# Patient Record
Sex: MALE | Race: WHITE | NOT HISPANIC OR LATINO
[De-identification: names, ages, dates, MRNs, and addresses within clinical notes are randomized per-mention and may not be internally consistent; named-entity substitution may affect disease eponyms.]

---

## 2017-05-19 PROBLEM — Z00.00 ENCOUNTER FOR PREVENTIVE HEALTH EXAMINATION: Status: ACTIVE | Noted: 2017-05-19

## 2017-05-23 ENCOUNTER — APPOINTMENT (OUTPATIENT)
Dept: NEPHROLOGY | Facility: CLINIC | Age: 67
End: 2017-05-23

## 2017-05-23 ENCOUNTER — LABORATORY RESULT (OUTPATIENT)
Age: 67
End: 2017-05-23

## 2017-05-23 VITALS — WEIGHT: 231.48 LBS | HEIGHT: 71 IN | BODY MASS INDEX: 32.41 KG/M2

## 2017-05-23 VITALS — HEART RATE: 88 BPM | OXYGEN SATURATION: 97 %

## 2017-05-23 VITALS — SYSTOLIC BLOOD PRESSURE: 190 MMHG | DIASTOLIC BLOOD PRESSURE: 90 MMHG

## 2017-05-23 DIAGNOSIS — Z87.19 PERSONAL HISTORY OF OTHER DISEASES OF THE DIGESTIVE SYSTEM: ICD-10-CM

## 2017-05-23 DIAGNOSIS — M54.30 SCIATICA, UNSPECIFIED SIDE: ICD-10-CM

## 2017-05-23 DIAGNOSIS — Z87.2 PERSONAL HISTORY OF DISEASES OF THE SKIN AND SUBCUTANEOUS TISSUE: ICD-10-CM

## 2017-05-23 DIAGNOSIS — F41.0 PANIC DISORDER [EPISODIC PAROXYSMAL ANXIETY]: ICD-10-CM

## 2017-05-23 DIAGNOSIS — Z87.11 PERSONAL HISTORY OF PEPTIC ULCER DISEASE: ICD-10-CM

## 2017-05-24 ENCOUNTER — TRANSCRIPTION ENCOUNTER (OUTPATIENT)
Age: 67
End: 2017-05-24

## 2017-05-24 LAB
ALBUMIN SERPL ELPH-MCNC: 4.3 G/DL
ANION GAP SERPL CALC-SCNC: 19 MMOL/L
APPEARANCE: CLEAR
BACTERIA: NEGATIVE
BILIRUBIN URINE: NEGATIVE
BLOOD URINE: ABNORMAL
BUN SERPL-MCNC: 20 MG/DL
C3 SERPL-MCNC: 130 MG/DL
C4 SERPL-MCNC: 29 MG/DL
CALCIUM SERPL-MCNC: 10 MG/DL
CHLORIDE SERPL-SCNC: 100 MMOL/L
CK SERPL-CCNC: 63 U/L
CO2 SERPL-SCNC: 20 MMOL/L
COLOR: YELLOW
CREAT SERPL-MCNC: 1.24 MG/DL
CREAT SPEC-SCNC: 53 MG/DL
CREAT/PROT UR: 1.1 RATIO
DEPRECATED KAPPA LC FREE/LAMBDA SER: 1.11 RATIO
DSDNA AB SER-ACNC: <12 IU/ML
ENA RNP AB SER IA-ACNC: 0.9 AL
ENA SM AB SER IA-ACNC: <0.2 AL
GLUCOSE QUALITATIVE U: NORMAL MG/DL
GLUCOSE SERPL-MCNC: 101 MG/DL
HAV IGM SER QL: NONREACTIVE
HBA1C MFR BLD HPLC: 5.9 %
HBV CORE IGM SER QL: NONREACTIVE
HBV SURFACE AG SER QL: NONREACTIVE
HCV AB SER QL: NONREACTIVE
HCV S/CO RATIO: 0.13 S/CO
HYALINE CASTS: 0 /LPF
KAPPA LC CSF-MCNC: 2.3 MG/DL
KAPPA LC SERPL-MCNC: 2.55 MG/DL
KETONES URINE: NEGATIVE
LEUKOCYTE ESTERASE URINE: NEGATIVE
MICROSCOPIC-UA: NORMAL
NITRITE URINE: NEGATIVE
PH URINE: 6
PHOSPHATE SERPL-MCNC: 4.2 MG/DL
POTASSIUM SERPL-SCNC: 4.9 MMOL/L
PROT UR-MCNC: 58 MG/DL
PROTEIN URINE: 100 MG/DL
RED BLOOD CELLS URINE: 1 /HPF
SODIUM SERPL-SCNC: 139 MMOL/L
SPECIFIC GRAVITY URINE: 1.01
SQUAMOUS EPITHELIAL CELLS: 0 /HPF
UROBILINOGEN URINE: NORMAL MG/DL
WHITE BLOOD CELLS URINE: 0 /HPF

## 2017-05-25 LAB
ALBUMIN MFR SERPL ELPH: 59.7 %
ALBUMIN SERPL-MCNC: 4.5 G/DL
ALBUMIN/GLOB SERPL: 1.5 RATIO
ALPHA1 GLOB MFR SERPL ELPH: 3.8 %
ALPHA1 GLOB SERPL ELPH-MCNC: 0.3 G/DL
ALPHA2 GLOB MFR SERPL ELPH: 9.4 %
ALPHA2 GLOB SERPL ELPH-MCNC: 0.7 G/DL
B-GLOBULIN MFR SERPL ELPH: 14 %
B-GLOBULIN SERPL ELPH-MCNC: 1.1 G/DL
GAMMA GLOB FLD ELPH-MCNC: 1 G/DL
GAMMA GLOB MFR SERPL ELPH: 13.1 %
HISTONE AB SER QL: 0.5 UNITS
INTERPRETATION SERPL IEP-IMP: NORMAL
M PROTEIN SPEC IFE-MCNC: NORMAL
PROT SERPL-MCNC: 7.6 G/DL
PROT SERPL-MCNC: 7.6 G/DL

## 2017-05-25 RX ORDER — CARVEDILOL 12.5 MG/1
12.5 TABLET, FILM COATED ORAL
Refills: 0 | Status: DISCONTINUED | COMMUNITY
Start: 2017-05-23 | End: 2017-05-25

## 2017-06-09 ENCOUNTER — APPOINTMENT (OUTPATIENT)
Dept: ORTHOPEDIC SURGERY | Facility: CLINIC | Age: 67
End: 2017-06-09

## 2017-06-09 VITALS — HEART RATE: 116 BPM | DIASTOLIC BLOOD PRESSURE: 87 MMHG | SYSTOLIC BLOOD PRESSURE: 153 MMHG

## 2017-06-09 VITALS — BODY MASS INDEX: 32.34 KG/M2 | WEIGHT: 231 LBS | HEIGHT: 71 IN

## 2017-06-23 ENCOUNTER — APPOINTMENT (OUTPATIENT)
Dept: ORTHOPEDIC SURGERY | Facility: CLINIC | Age: 67
End: 2017-06-23

## 2017-06-23 VITALS
DIASTOLIC BLOOD PRESSURE: 74 MMHG | BODY MASS INDEX: 32.34 KG/M2 | SYSTOLIC BLOOD PRESSURE: 127 MMHG | HEART RATE: 123 BPM | WEIGHT: 231 LBS | HEIGHT: 71 IN

## 2017-06-23 DIAGNOSIS — M48.07 SPINAL STENOSIS, LUMBOSACRAL REGION: ICD-10-CM

## 2017-07-11 ENCOUNTER — FORM ENCOUNTER (OUTPATIENT)
Age: 67
End: 2017-07-11

## 2017-07-12 ENCOUNTER — OUTPATIENT (OUTPATIENT)
Dept: OUTPATIENT SERVICES | Facility: HOSPITAL | Age: 67
LOS: 1 days | End: 2017-07-12
Payer: MEDICARE

## 2017-07-12 ENCOUNTER — APPOINTMENT (OUTPATIENT)
Dept: MRI IMAGING | Facility: CLINIC | Age: 67
End: 2017-07-12

## 2017-07-12 DIAGNOSIS — M48.07 SPINAL STENOSIS, LUMBOSACRAL REGION: ICD-10-CM

## 2017-07-12 PROCEDURE — 72148 MRI LUMBAR SPINE W/O DYE: CPT

## 2017-07-17 ENCOUNTER — APPOINTMENT (OUTPATIENT)
Dept: ORTHOPEDIC SURGERY | Facility: CLINIC | Age: 67
End: 2017-07-17

## 2017-07-17 VITALS
BODY MASS INDEX: 32.34 KG/M2 | HEIGHT: 71 IN | DIASTOLIC BLOOD PRESSURE: 80 MMHG | WEIGHT: 231 LBS | SYSTOLIC BLOOD PRESSURE: 143 MMHG

## 2017-07-17 DIAGNOSIS — M54.16 RADICULOPATHY, LUMBAR REGION: ICD-10-CM

## 2017-08-30 ENCOUNTER — APPOINTMENT (OUTPATIENT)
Dept: NEPHROLOGY | Facility: CLINIC | Age: 67
End: 2017-08-30
Payer: MEDICARE

## 2017-08-30 VITALS
DIASTOLIC BLOOD PRESSURE: 103 MMHG | OXYGEN SATURATION: 98 % | SYSTOLIC BLOOD PRESSURE: 173 MMHG | BODY MASS INDEX: 32.62 KG/M2 | HEART RATE: 104 BPM | HEIGHT: 71 IN | WEIGHT: 233 LBS

## 2017-08-30 VITALS — SYSTOLIC BLOOD PRESSURE: 140 MMHG | DIASTOLIC BLOOD PRESSURE: 100 MMHG

## 2017-08-30 PROCEDURE — 99215 OFFICE O/P EST HI 40 MIN: CPT

## 2017-08-30 RX ORDER — METHYLPREDNISOLONE 4 MG/1
4 TABLET ORAL
Qty: 1 | Refills: 1 | Status: DISCONTINUED | COMMUNITY
Start: 2017-06-23 | End: 2017-08-30

## 2017-08-30 RX ORDER — METHYLPREDNISOLONE 4 MG/1
4 TABLET ORAL
Qty: 1 | Refills: 1 | Status: DISCONTINUED | COMMUNITY
Start: 2017-06-09 | End: 2017-08-30

## 2017-09-01 LAB
APPEARANCE: CLEAR
BACTERIA: ABNORMAL
BILIRUBIN URINE: NEGATIVE
BLOOD URINE: NEGATIVE
COLOR: YELLOW
CREAT SPEC-SCNC: 60 MG/DL
CREAT/PROT UR: 0.7 RATIO
GLUCOSE QUALITATIVE U: NORMAL MG/DL
HYALINE CASTS: 0 /LPF
KETONES URINE: NEGATIVE
LEUKOCYTE ESTERASE URINE: NEGATIVE
MICROSCOPIC-UA: NORMAL
NITRITE URINE: NEGATIVE
PH URINE: 6
PROT UR-MCNC: 40 MG/DL
PROTEIN URINE: 30 MG/DL
RED BLOOD CELLS URINE: 1 /HPF
SPECIFIC GRAVITY URINE: 1.01
SQUAMOUS EPITHELIAL CELLS: 1 /HPF
UROBILINOGEN URINE: NORMAL MG/DL
WHITE BLOOD CELLS URINE: 0 /HPF

## 2017-09-22 ENCOUNTER — MEDICATION RENEWAL (OUTPATIENT)
Age: 67
End: 2017-09-22

## 2017-12-20 ENCOUNTER — APPOINTMENT (OUTPATIENT)
Dept: NEPHROLOGY | Facility: CLINIC | Age: 67
End: 2017-12-20

## 2018-01-17 ENCOUNTER — APPOINTMENT (OUTPATIENT)
Dept: NEPHROLOGY | Facility: CLINIC | Age: 68
End: 2018-01-17
Payer: MEDICARE

## 2018-01-17 VITALS
WEIGHT: 236 LBS | SYSTOLIC BLOOD PRESSURE: 181 MMHG | BODY MASS INDEX: 33.04 KG/M2 | HEART RATE: 122 BPM | HEIGHT: 71 IN | OXYGEN SATURATION: 96 % | DIASTOLIC BLOOD PRESSURE: 105 MMHG

## 2018-01-17 PROCEDURE — 99215 OFFICE O/P EST HI 40 MIN: CPT

## 2018-01-18 LAB
25(OH)D3 SERPL-MCNC: 43.9 NG/ML
ALBUMIN SERPL ELPH-MCNC: 4.6 G/DL
ANION GAP SERPL CALC-SCNC: 13 MMOL/L
APPEARANCE: CLEAR
BACTERIA: NEGATIVE
BASOPHILS # BLD AUTO: 0.05 K/UL
BASOPHILS NFR BLD AUTO: 0.8 %
BILIRUBIN URINE: NEGATIVE
BLOOD URINE: NEGATIVE
BUN SERPL-MCNC: 22 MG/DL
CALCIUM SERPL-MCNC: 9.4 MG/DL
CHLORIDE SERPL-SCNC: 103 MMOL/L
CO2 SERPL-SCNC: 26 MMOL/L
COLOR: YELLOW
CREAT SERPL-MCNC: 1.19 MG/DL
CREAT SPEC-SCNC: 39 MG/DL
CREAT/PROT UR: 1.5 RATIO
EOSINOPHIL # BLD AUTO: 0.1 K/UL
EOSINOPHIL NFR BLD AUTO: 1.5 %
GLUCOSE QUALITATIVE U: NEGATIVE MG/DL
GLUCOSE SERPL-MCNC: 114 MG/DL
HCT VFR BLD CALC: 45.8 %
HGB BLD-MCNC: 16 G/DL
IMM GRANULOCYTES NFR BLD AUTO: 0.2 %
KETONES URINE: NEGATIVE
LEUKOCYTE ESTERASE URINE: NEGATIVE
LYMPHOCYTES # BLD AUTO: 1.11 K/UL
LYMPHOCYTES NFR BLD AUTO: 17 %
MAN DIFF?: NORMAL
MCHC RBC-ENTMCNC: 32.5 PG
MCHC RBC-ENTMCNC: 34.9 GM/DL
MCV RBC AUTO: 93.1 FL
MICROSCOPIC-UA: NORMAL
MONOCYTES # BLD AUTO: 0.79 K/UL
MONOCYTES NFR BLD AUTO: 12.1 %
NEUTROPHILS # BLD AUTO: 4.46 K/UL
NEUTROPHILS NFR BLD AUTO: 68.4 %
NITRITE URINE: NEGATIVE
PH URINE: 6
PHOSPHATE SERPL-MCNC: 2.9 MG/DL
PLATELET # BLD AUTO: 251 K/UL
POTASSIUM SERPL-SCNC: 4.6 MMOL/L
PROT UR-MCNC: 59 MG/DL
PROTEIN URINE: 100 MG/DL
RBC # BLD: 4.92 M/UL
RBC # FLD: 12.4 %
RED BLOOD CELLS URINE: 0 /HPF
SODIUM SERPL-SCNC: 142 MMOL/L
SPECIFIC GRAVITY URINE: 1.01
SQUAMOUS EPITHELIAL CELLS: 0 /HPF
UROBILINOGEN URINE: NEGATIVE MG/DL
WBC # FLD AUTO: 6.52 K/UL
WHITE BLOOD CELLS URINE: 1 /HPF

## 2018-02-21 LAB
ALBUMIN SERPL ELPH-MCNC: 4.5 G/DL
ANION GAP SERPL CALC-SCNC: 15 MMOL/L
BUN SERPL-MCNC: 14 MG/DL
CALCIUM SERPL-MCNC: 9 MG/DL
CHLORIDE SERPL-SCNC: 102 MMOL/L
CO2 SERPL-SCNC: 23 MMOL/L
CREAT SERPL-MCNC: 1.4 MG/DL
CREAT SPEC-SCNC: 34 MG/DL
CREAT/PROT UR: 1.4 RATIO
GLUCOSE SERPL-MCNC: 109 MG/DL
PHOSPHATE SERPL-MCNC: 2.8 MG/DL
POTASSIUM SERPL-SCNC: 4.9 MMOL/L
PROT UR-MCNC: 47 MG/DL
SODIUM SERPL-SCNC: 140 MMOL/L

## 2018-04-04 ENCOUNTER — APPOINTMENT (OUTPATIENT)
Dept: NEPHROLOGY | Facility: CLINIC | Age: 68
End: 2018-04-04
Payer: MEDICARE

## 2018-04-04 VITALS
SYSTOLIC BLOOD PRESSURE: 187 MMHG | HEART RATE: 115 BPM | WEIGHT: 239 LBS | HEIGHT: 71 IN | BODY MASS INDEX: 33.46 KG/M2 | DIASTOLIC BLOOD PRESSURE: 105 MMHG | OXYGEN SATURATION: 96 %

## 2018-04-04 PROCEDURE — 99214 OFFICE O/P EST MOD 30 MIN: CPT

## 2018-04-09 LAB
25(OH)D3 SERPL-MCNC: 38.2 NG/ML
ALBUMIN SERPL ELPH-MCNC: 4.5 G/DL
ANION GAP SERPL CALC-SCNC: 16 MMOL/L
APPEARANCE: CLEAR
BACTERIA: NEGATIVE
BASOPHILS # BLD AUTO: 0.04 K/UL
BASOPHILS NFR BLD AUTO: 0.5 %
BILIRUBIN URINE: NEGATIVE
BLOOD URINE: NEGATIVE
BUN SERPL-MCNC: 17 MG/DL
CALCIUM SERPL-MCNC: 9.4 MG/DL
CALCIUM SERPL-MCNC: 9.4 MG/DL
CHLORIDE SERPL-SCNC: 103 MMOL/L
CO2 SERPL-SCNC: 20 MMOL/L
COLOR: YELLOW
CREAT SERPL-MCNC: 1.21 MG/DL
CREAT SPEC-SCNC: 37 MG/DL
CREAT/PROT UR: 1.5 RATIO
EOSINOPHIL # BLD AUTO: 0.16 K/UL
EOSINOPHIL NFR BLD AUTO: 1.9 %
GLUCOSE QUALITATIVE U: NEGATIVE MG/DL
GLUCOSE SERPL-MCNC: 110 MG/DL
HCT VFR BLD CALC: 46.4 %
HGB BLD-MCNC: 15.9 G/DL
IMM GRANULOCYTES NFR BLD AUTO: 0.6 %
KETONES URINE: NEGATIVE
LEUKOCYTE ESTERASE URINE: NEGATIVE
LYMPHOCYTES # BLD AUTO: 1.84 K/UL
LYMPHOCYTES NFR BLD AUTO: 21.9 %
MAN DIFF?: NORMAL
MCHC RBC-ENTMCNC: 32.3 PG
MCHC RBC-ENTMCNC: 34.3 GM/DL
MCV RBC AUTO: 94.3 FL
MICROSCOPIC-UA: NORMAL
MONOCYTES # BLD AUTO: 0.76 K/UL
MONOCYTES NFR BLD AUTO: 9 %
NEUTROPHILS # BLD AUTO: 5.55 K/UL
NEUTROPHILS NFR BLD AUTO: 66.1 %
NITRITE URINE: NEGATIVE
PARATHYROID HORMONE INTACT: 36 PG/ML
PH URINE: 5.5
PHOSPHATE SERPL-MCNC: 2.9 MG/DL
PLATELET # BLD AUTO: 276 K/UL
POTASSIUM SERPL-SCNC: 4.6 MMOL/L
PROT UR-MCNC: 56 MG/DL
PROTEIN URINE: 30 MG/DL
RBC # BLD: 4.92 M/UL
RBC # FLD: 12 %
RED BLOOD CELLS URINE: 0 /HPF
SODIUM SERPL-SCNC: 139 MMOL/L
SPECIFIC GRAVITY URINE: 1.01
SQUAMOUS EPITHELIAL CELLS: 0 /HPF
UROBILINOGEN URINE: NEGATIVE MG/DL
WBC # FLD AUTO: 8.4 K/UL
WHITE BLOOD CELLS URINE: 0 /HPF

## 2018-05-04 ENCOUNTER — MEDICATION RENEWAL (OUTPATIENT)
Age: 68
End: 2018-05-04

## 2018-07-11 ENCOUNTER — APPOINTMENT (OUTPATIENT)
Dept: NEPHROLOGY | Facility: CLINIC | Age: 68
End: 2018-07-11
Payer: MEDICARE

## 2018-07-11 VITALS
HEIGHT: 71 IN | WEIGHT: 236 LBS | OXYGEN SATURATION: 96 % | SYSTOLIC BLOOD PRESSURE: 132 MMHG | BODY MASS INDEX: 33.04 KG/M2 | HEART RATE: 119 BPM | DIASTOLIC BLOOD PRESSURE: 97 MMHG

## 2018-07-11 DIAGNOSIS — E78.00 PURE HYPERCHOLESTEROLEMIA, UNSPECIFIED: ICD-10-CM

## 2018-07-11 DIAGNOSIS — Z87.19 PERSONAL HISTORY OF OTHER DISEASES OF THE DIGESTIVE SYSTEM: ICD-10-CM

## 2018-07-11 DIAGNOSIS — Z80.3 FAMILY HISTORY OF MALIGNANT NEOPLASM OF BREAST: ICD-10-CM

## 2018-07-11 DIAGNOSIS — N18.2 CHRONIC KIDNEY DISEASE, STAGE 2 (MILD): ICD-10-CM

## 2018-07-11 DIAGNOSIS — Z86.39 PERSONAL HISTORY OF OTHER ENDOCRINE, NUTRITIONAL AND METABOLIC DISEASE: ICD-10-CM

## 2018-07-11 DIAGNOSIS — Z82.49 FAMILY HISTORY OF ISCHEMIC HEART DISEASE AND OTHER DISEASES OF THE CIRCULATORY SYSTEM: ICD-10-CM

## 2018-07-11 PROCEDURE — 99214 OFFICE O/P EST MOD 30 MIN: CPT | Mod: GC

## 2018-07-11 RX ORDER — LOSARTAN POTASSIUM 100 MG/1
100 TABLET, FILM COATED ORAL
Qty: 90 | Refills: 0 | Status: ACTIVE | COMMUNITY
Start: 2017-05-23

## 2018-07-11 RX ORDER — TAMSULOSIN HYDROCHLORIDE 0.4 MG/1
0.4 CAPSULE ORAL
Qty: 30 | Refills: 2 | Status: ACTIVE | COMMUNITY
Start: 2017-05-23

## 2018-07-11 RX ORDER — ROSUVASTATIN CALCIUM 10 MG/1
10 TABLET, FILM COATED ORAL
Refills: 0 | Status: ACTIVE | COMMUNITY
Start: 2017-05-23

## 2018-07-11 RX ORDER — LORAZEPAM 2 MG/1
2 TABLET ORAL 4 TIMES DAILY
Refills: 0 | Status: ACTIVE | COMMUNITY
Start: 2017-05-23

## 2018-07-12 PROBLEM — N18.2 STAGE 2 CHRONIC KIDNEY DISEASE: Status: ACTIVE | Noted: 2017-08-31

## 2018-07-12 LAB
ALBUMIN SERPL ELPH-MCNC: 4.5 G/DL
ANION GAP SERPL CALC-SCNC: 16 MMOL/L
APPEARANCE: CLEAR
BACTERIA: NEGATIVE
BASOPHILS # BLD AUTO: 0.05 K/UL
BASOPHILS NFR BLD AUTO: 0.6 %
BILIRUBIN URINE: NEGATIVE
BLOOD URINE: NEGATIVE
BUN SERPL-MCNC: 14 MG/DL
CALCIUM SERPL-MCNC: 9.3 MG/DL
CHLORIDE SERPL-SCNC: 104 MMOL/L
CO2 SERPL-SCNC: 19 MMOL/L
COLOR: YELLOW
CREAT SERPL-MCNC: 1.31 MG/DL
EOSINOPHIL # BLD AUTO: 0.38 K/UL
EOSINOPHIL NFR BLD AUTO: 4.6 %
GLUCOSE QUALITATIVE U: NEGATIVE MG/DL
GLUCOSE SERPL-MCNC: 114 MG/DL
HCT VFR BLD CALC: 45.7 %
HGB BLD-MCNC: 15.9 G/DL
IMM GRANULOCYTES NFR BLD AUTO: 0.1 %
KETONES URINE: NEGATIVE
LEUKOCYTE ESTERASE URINE: NEGATIVE
LYMPHOCYTES # BLD AUTO: 1.64 K/UL
LYMPHOCYTES NFR BLD AUTO: 19.8 %
MAN DIFF?: NORMAL
MCHC RBC-ENTMCNC: 32.3 PG
MCHC RBC-ENTMCNC: 34.8 GM/DL
MCV RBC AUTO: 92.7 FL
MICROSCOPIC-UA: NORMAL
MONOCYTES # BLD AUTO: 0.78 K/UL
MONOCYTES NFR BLD AUTO: 9.4 %
NEUTROPHILS # BLD AUTO: 5.42 K/UL
NEUTROPHILS NFR BLD AUTO: 65.5 %
NITRITE URINE: NEGATIVE
PH URINE: 5.5
PHOSPHATE SERPL-MCNC: 3 MG/DL
PLATELET # BLD AUTO: 272 K/UL
POTASSIUM SERPL-SCNC: 4.5 MMOL/L
PROTEIN URINE: 100 MG/DL
RBC # BLD: 4.93 M/UL
RBC # FLD: 12.4 %
RED BLOOD CELLS URINE: 1 /HPF
SODIUM SERPL-SCNC: 139 MMOL/L
SPECIFIC GRAVITY URINE: 1.01
SQUAMOUS EPITHELIAL CELLS: 0 /HPF
UROBILINOGEN URINE: NEGATIVE MG/DL
WBC # FLD AUTO: 8.28 K/UL
WHITE BLOOD CELLS URINE: 0 /HPF

## 2018-07-16 LAB
CREAT SPEC-SCNC: 77 MG/DL
CREAT/PROT UR: 1.3 RATIO
PROT UR-MCNC: 98 MG/DL

## 2018-08-20 ENCOUNTER — FORM ENCOUNTER (OUTPATIENT)
Age: 68
End: 2018-08-20

## 2018-08-21 ENCOUNTER — OUTPATIENT (OUTPATIENT)
Dept: OUTPATIENT SERVICES | Facility: HOSPITAL | Age: 68
LOS: 1 days | End: 2018-08-21
Payer: MEDICARE

## 2018-08-21 ENCOUNTER — APPOINTMENT (OUTPATIENT)
Dept: ULTRASOUND IMAGING | Facility: CLINIC | Age: 68
End: 2018-08-21
Payer: MEDICARE

## 2018-08-21 DIAGNOSIS — N18.3 CHRONIC KIDNEY DISEASE, STAGE 3 (MODERATE): ICD-10-CM

## 2018-08-21 DIAGNOSIS — Z87.898 PERSONAL HISTORY OF OTHER SPECIFIED CONDITIONS: ICD-10-CM

## 2018-08-21 PROCEDURE — 76770 US EXAM ABDO BACK WALL COMP: CPT | Mod: 26

## 2018-08-21 PROCEDURE — 76770 US EXAM ABDO BACK WALL COMP: CPT

## 2018-08-30 ENCOUNTER — MEDICATION RENEWAL (OUTPATIENT)
Age: 68
End: 2018-08-30

## 2018-11-21 ENCOUNTER — APPOINTMENT (OUTPATIENT)
Dept: NEPHROLOGY | Facility: CLINIC | Age: 68
End: 2018-11-21

## 2018-12-28 ENCOUNTER — RX RENEWAL (OUTPATIENT)
Age: 68
End: 2018-12-28

## 2018-12-28 RX ORDER — DILTIAZEM HYDROCHLORIDE 240 MG/1
240 CAPSULE, EXTENDED RELEASE ORAL
Qty: 30 | Refills: 3 | Status: ACTIVE | COMMUNITY
Start: 2017-05-25 | End: 1900-01-01

## 2019-01-09 ENCOUNTER — APPOINTMENT (OUTPATIENT)
Dept: NEPHROLOGY | Facility: CLINIC | Age: 69
End: 2019-01-09
Payer: MEDICARE

## 2019-01-09 VITALS
HEART RATE: 126 BPM | WEIGHT: 231 LBS | SYSTOLIC BLOOD PRESSURE: 154 MMHG | OXYGEN SATURATION: 98 % | HEIGHT: 71 IN | BODY MASS INDEX: 32.34 KG/M2 | DIASTOLIC BLOOD PRESSURE: 98 MMHG

## 2019-01-09 VITALS — DIASTOLIC BLOOD PRESSURE: 90 MMHG | SYSTOLIC BLOOD PRESSURE: 160 MMHG

## 2019-01-09 PROCEDURE — 99214 OFFICE O/P EST MOD 30 MIN: CPT

## 2019-01-10 LAB
APPEARANCE: CLEAR
BACTERIA: NEGATIVE
BILIRUBIN URINE: NEGATIVE
BLOOD URINE: NEGATIVE
COLOR: YELLOW
CREAT SPEC-SCNC: 128 MG/DL
CREAT/PROT UR: 1 RATIO
GLUCOSE QUALITATIVE U: NEGATIVE MG/DL
HYALINE CASTS: 1 /LPF
KETONES URINE: NEGATIVE
LEUKOCYTE ESTERASE URINE: NEGATIVE
MICROSCOPIC-UA: NORMAL
NITRITE URINE: NEGATIVE
PH URINE: 6
PROT UR-MCNC: 130 MG/DL
PROTEIN URINE: 100 MG/DL
RED BLOOD CELLS URINE: 2 /HPF
SPECIFIC GRAVITY URINE: 1.02
SQUAMOUS EPITHELIAL CELLS: 1 /HPF
UROBILINOGEN URINE: NEGATIVE MG/DL
WHITE BLOOD CELLS URINE: 2 /HPF

## 2019-01-11 RX ORDER — DILTIAZEM HYDROCHLORIDE 120 MG/1
120 CAPSULE, EXTENDED RELEASE ORAL
Qty: 30 | Refills: 1 | Status: ACTIVE | COMMUNITY
Start: 2019-01-11 | End: 1900-01-01

## 2019-01-11 NOTE — PHYSICAL EXAM
[General Appearance - Alert] : alert [General Appearance - In No Acute Distress] : in no acute distress [Neck Appearance] : the appearance of the neck was normal [] : no respiratory distress [Respiration, Rhythm And Depth] : normal respiratory rhythm and effort [Exaggerated Use Of Accessory Muscles For Inspiration] : no accessory muscle use [Auscultation Breath Sounds / Voice Sounds] : lungs were clear to auscultation bilaterally [Apical Impulse] : the apical impulse was normal [Heart Sounds] : normal S1 and S2 [Heart Sounds Gallop] : no gallops [Murmurs] : no murmurs [FreeTextEntry1] : +psoriatic patches behind the ear

## 2019-01-11 NOTE — ASSESSMENT
[FreeTextEntry1] : Mr Garcia is a 69 y/o male with H/O HTN, anxiety, BPH, psoriasis and CKD\par \par CKD : mild stage 2/3 possibly related to long standing HTN\par  His serological w/u in the past has been negative  \par Recent blood work with   Scr 1.2 ( outside labs) \par Reiterated avoidance of NSAIDS/contrast.  \par Metabolic acidosis: Historically with Borderline low serum bicarbonate\par He will fax me repeat blood work from his PCP office\par If Bicarb less than 20, will start sodium bicarbonate\par \par \par \par HTN/ Proteinuria: \par pt with 1.5 gm proteinuria. \par Repeat Pr/Cr ratio  slightly better\par Maintain on cardizem  240 mg and losartan 100 ( maximal dose)\par Add additional 120 mg of cardizem to get urine protein to less than 1 gm ( e prescribed)\par He will call call me if any adverse effects\par  \par \par MBD: Ca/Phos is Ok with normal Vitamin D levels  \par \par  Discussed results with him \par F/U in  4 months

## 2019-01-11 NOTE — HISTORY OF PRESENT ILLNESS
[FreeTextEntry1] : Mr Sherman is a 69 y/o male with H/O anxiety, BPH and HTN ( for several years), mild CKD ( baseline creatinine: 1.2 mg/dl) with proteinuria  and lumbar stenosis  here or a follow up visit. Accompanied by wife. \par \par  No interval infections or hospitalizations. In  Nov of last year, he was  prescribed HCTZ 12.5 mg for edema ( GFR 60/Cr 1.2). Took for 1 month then  repeat blood work with increase in creatinine (GFR 46/Cr 1.53)stopped HCTZ. 10 days later got repeat blood work with improvement in renal function( GFR 58/Cr 1.2 mg/dl). Also, found to have elevated TSH. BP at home is being monitored closely: Well controlled with normal pulse. Feels well . Leg swelling is better. Weight down to 5 lbs\par takes turmeric/coQ10, Lutein, hyaluronic acid\par \par

## 2019-01-11 NOTE — REVIEW OF SYSTEMS
[Nocturia] : nocturia [Negative] : Gastrointestinal [Recent Weight Loss (___ Lbs)] : recent [unfilled] ~Ulb weight loss [Fever] : no fever [Chills] : no chills [Feeling Poorly] : not feeling poorly [Chest Pain] : no chest pain [Palpitations] : no palpitations [Lower Ext Edema] : no extremity edema [Shortness Of Breath] : no shortness of breath [Wheezing] : no wheezing [Cough] : no cough [SOB on Exertion] : no shortness of breath during exertion [Abdominal Pain] : no abdominal pain [Vomiting] : no vomiting [Constipation] : no constipation [Diarrhea] : no diarrhea [Dysuria] : no dysuria [Incontinence] : no incontinence [Hesitancy] : no urinary hesitancy

## 2019-03-11 ENCOUNTER — RX RENEWAL (OUTPATIENT)
Age: 69
End: 2019-03-11

## 2019-03-12 ENCOUNTER — TRANSCRIPTION ENCOUNTER (OUTPATIENT)
Age: 69
End: 2019-03-12

## 2019-04-04 ENCOUNTER — RX RENEWAL (OUTPATIENT)
Age: 69
End: 2019-04-04

## 2019-08-21 ENCOUNTER — APPOINTMENT (OUTPATIENT)
Dept: NEPHROLOGY | Facility: CLINIC | Age: 69
End: 2019-08-21
Payer: MEDICARE

## 2019-08-21 VITALS
SYSTOLIC BLOOD PRESSURE: 166 MMHG | WEIGHT: 240 LBS | BODY MASS INDEX: 33.6 KG/M2 | HEIGHT: 71 IN | OXYGEN SATURATION: 97 % | HEART RATE: 115 BPM | DIASTOLIC BLOOD PRESSURE: 98 MMHG

## 2019-08-21 PROCEDURE — 99214 OFFICE O/P EST MOD 30 MIN: CPT

## 2019-08-23 LAB
25(OH)D3 SERPL-MCNC: 24.2 NG/ML
ALBUMIN SERPL ELPH-MCNC: 4.5 G/DL
ANION GAP SERPL CALC-SCNC: 13 MMOL/L
APPEARANCE: CLEAR
BACTERIA: NEGATIVE
BASOPHILS # BLD AUTO: 0.06 K/UL
BASOPHILS NFR BLD AUTO: 0.8 %
BILIRUBIN URINE: NEGATIVE
BLOOD URINE: NEGATIVE
BUN SERPL-MCNC: 20 MG/DL
CALCIUM SERPL-MCNC: 9.8 MG/DL
CALCIUM SERPL-MCNC: 9.8 MG/DL
CHLORIDE SERPL-SCNC: 104 MMOL/L
CO2 SERPL-SCNC: 22 MMOL/L
COLOR: YELLOW
CREAT SERPL-MCNC: 1.47 MG/DL
CREAT SPEC-SCNC: 123 MG/DL
CREAT/PROT UR: 1.2 RATIO
EOSINOPHIL # BLD AUTO: 0.29 K/UL
EOSINOPHIL NFR BLD AUTO: 3.8 %
ESTIMATED AVERAGE GLUCOSE: 123 MG/DL
FERRITIN SERPL-MCNC: 153 NG/ML
GLUCOSE QUALITATIVE U: NEGATIVE
GLUCOSE SERPL-MCNC: 116 MG/DL
HBA1C MFR BLD HPLC: 5.9 %
HCT VFR BLD CALC: 45.9 %
HGB BLD-MCNC: 15.2 G/DL
HYALINE CASTS: 1 /LPF
IMM GRANULOCYTES NFR BLD AUTO: 0.3 %
IRON SATN MFR SERPL: 21 %
IRON SERPL-MCNC: 81 UG/DL
KETONES URINE: NEGATIVE
LEUKOCYTE ESTERASE URINE: NEGATIVE
LYMPHOCYTES # BLD AUTO: 1.65 K/UL
LYMPHOCYTES NFR BLD AUTO: 21.5 %
MAGNESIUM SERPL-MCNC: 2.1 MG/DL
MAN DIFF?: NORMAL
MCHC RBC-ENTMCNC: 32.1 PG
MCHC RBC-ENTMCNC: 33.1 GM/DL
MCV RBC AUTO: 97 FL
MICROSCOPIC-UA: NORMAL
MONOCYTES # BLD AUTO: 0.86 K/UL
MONOCYTES NFR BLD AUTO: 11.2 %
NEUTROPHILS # BLD AUTO: 4.79 K/UL
NEUTROPHILS NFR BLD AUTO: 62.4 %
NITRITE URINE: NEGATIVE
PARATHYROID HORMONE INTACT: 53 PG/ML
PH URINE: 6
PHOSPHATE SERPL-MCNC: 3.5 MG/DL
PLATELET # BLD AUTO: 287 K/UL
POTASSIUM SERPL-SCNC: 5.1 MMOL/L
PROT UR-MCNC: 147 MG/DL
PROTEIN URINE: ABNORMAL
RBC # BLD: 4.73 M/UL
RBC # FLD: 11.7 %
RED BLOOD CELLS URINE: 2 /HPF
SODIUM SERPL-SCNC: 139 MMOL/L
SPECIFIC GRAVITY URINE: 1.02
SQUAMOUS EPITHELIAL CELLS: 1 /HPF
TIBC SERPL-MCNC: 391 UG/DL
UIBC SERPL-MCNC: 310 UG/DL
UROBILINOGEN URINE: NORMAL
WBC # FLD AUTO: 7.67 K/UL
WHITE BLOOD CELLS URINE: 1 /HPF

## 2019-08-23 RX ORDER — UBIDECARENONE/VIT E ACET 100MG-5
25 MCG CAPSULE ORAL
Refills: 0 | Status: ACTIVE | COMMUNITY
Start: 2017-05-23

## 2019-08-23 NOTE — REVIEW OF SYSTEMS
[Nocturia] : nocturia [Negative] : Gastrointestinal [Recent Weight Gain (___ Lbs)] : recent [unfilled] ~Ulb weight gain [Lower Ext Edema] : lower extremity edema [Fever] : no fever [Recent Weight Loss (___ Lbs)] : no recent weight loss [Feeling Poorly] : not feeling poorly [Chills] : no chills [Palpitations] : no palpitations [Chest Pain] : no chest pain [Wheezing] : no wheezing [Shortness Of Breath] : no shortness of breath [Cough] : no cough [SOB on Exertion] : no shortness of breath during exertion [Abdominal Pain] : no abdominal pain [Constipation] : no constipation [Vomiting] : no vomiting [Dysuria] : no dysuria [Diarrhea] : no diarrhea [Incontinence] : no incontinence [Hesitancy] : no urinary hesitancy

## 2019-08-23 NOTE — PHYSICAL EXAM
[General Appearance - Alert] : alert [General Appearance - In No Acute Distress] : in no acute distress [Neck Appearance] : the appearance of the neck was normal [] : no respiratory distress [Respiration, Rhythm And Depth] : normal respiratory rhythm and effort [Exaggerated Use Of Accessory Muscles For Inspiration] : no accessory muscle use [Auscultation Breath Sounds / Voice Sounds] : lungs were clear to auscultation bilaterally [Apical Impulse] : the apical impulse was normal [Heart Sounds] : normal S1 and S2 [Heart Sounds Gallop] : no gallops [Murmurs] : no murmurs [Sclera] : the sclera and conjunctiva were normal [Examination Of The Oral Cavity] : the lips and gums were normal [PERRL With Normal Accommodation] : pupils were equal in size, round, and reactive to light [Oropharynx] : the oropharynx was normal [FreeTextEntry1] : +psoriatic patches behind the ear

## 2019-08-23 NOTE — ASSESSMENT
[FreeTextEntry1] : Mr Garcia is a 68 y/o male with H/O HTN, anxiety, BPH, psoriasis and CKD\par \par CKD : mild stage 2/3 possibly related to long standing HTN\par  His serological w/u in the past has been negative  \par Check repeat blood work\par Reiterated avoidance of NSAIDS/contrast.  \par Metabolic acidosis: Historically with Borderline low serum bicarbonate\par If Bicarb less than 20, will start sodium bicarbonate\par \par HTN/ Proteinuria: \par  Per him is BP is well controlled at home\par Repeat Pr/Cr ratio   \par Maintain on cardizem  240 +120  mg and losartan 100 ( maximal dose)\par  Has edema: told him to take diuretics as needed \par He will call call me if any adverse effects\par  \par \par MBD: Ca/Phos is Ok with normal Vitamin D levels  \par \par  Discussed results with him \par F/U in 3  months\par \par ADDENDUM:\par Serum creatinine is slightly up from last visit although it has been in this range before\par Proteinuria persists\par Needs to loose weight. Told him to stop all his supplements\par Mild Vitamin D insufficiency: increase to  2000 units of vitamin D daily\par Discussed results with the patient

## 2019-08-23 NOTE — HISTORY OF PRESENT ILLNESS
[FreeTextEntry1] : Mr Sherman is a 70 y/o male with H/O anxiety, BPH and HTN ( for several years), mild CKD ( baseline creatinine: 1.2 mg/dl) with proteinuria  and lumbar stenosis  here or a follow up visit. Accompanied by wife.  In  Nov of 2018,  he was  prescribed HCTZ 12.5 mg for edema ( GFR 60/Cr 1.2). Took for 1 month then  repeat blood work with increase in creatinine (GFR 46/Cr 1.53)stopped HCTZ. 10 days later got repeat blood work with improvement in renal function( GFR 58/Cr 1.2 mg/dl). \par \par \par Comes for a follow up visit today. Had a couple of blood tests done earlier this year:\par March, 2019:   GFR : 64, Cr:  1.16\par June : GFR 52, Cr 1.37: thought  he was not well hydrated\par  July: hydrated himself: 7/8: eGFR: 58 Cr: 1.25\par Has gained weight, went on vacation.  BP at home  has been normal\par takes turmeric/coQ10, Lutein, hyaluronic acid\par

## 2020-02-20 ENCOUNTER — APPOINTMENT (OUTPATIENT)
Dept: NEPHROLOGY | Facility: CLINIC | Age: 70
End: 2020-02-20
Payer: MEDICARE

## 2020-02-20 VITALS
OXYGEN SATURATION: 98 % | DIASTOLIC BLOOD PRESSURE: 115 MMHG | HEIGHT: 71 IN | HEART RATE: 130 BPM | BODY MASS INDEX: 34.16 KG/M2 | WEIGHT: 244 LBS | SYSTOLIC BLOOD PRESSURE: 198 MMHG

## 2020-02-20 VITALS — DIASTOLIC BLOOD PRESSURE: 90 MMHG | SYSTOLIC BLOOD PRESSURE: 180 MMHG

## 2020-02-20 DIAGNOSIS — Z78.9 OTHER SPECIFIED HEALTH STATUS: ICD-10-CM

## 2020-02-20 DIAGNOSIS — Z87.898 PERSONAL HISTORY OF OTHER SPECIFIED CONDITIONS: ICD-10-CM

## 2020-02-20 PROCEDURE — 99214 OFFICE O/P EST MOD 30 MIN: CPT

## 2020-02-20 RX ORDER — DILTIAZEM HYDROCHLORIDE 120 MG/1
120 CAPSULE, EXTENDED RELEASE ORAL
Qty: 30 | Refills: 0 | Status: DISCONTINUED | COMMUNITY
Start: 2019-03-11 | End: 2020-02-20

## 2020-02-20 RX ORDER — DILTIAZEM HYDROCHLORIDE 240 MG/1
240 CAPSULE, EXTENDED RELEASE ORAL
Qty: 90 | Refills: 3 | Status: DISCONTINUED | COMMUNITY
Start: 2019-04-04 | End: 2020-02-20

## 2020-02-21 LAB
APPEARANCE: CLEAR
BACTERIA: NEGATIVE
BILIRUBIN URINE: NEGATIVE
BLOOD URINE: NEGATIVE
COLOR: NORMAL
CREAT SPEC-SCNC: 74 MG/DL
CREAT/PROT UR: 1.9 RATIO
GLUCOSE QUALITATIVE U: NEGATIVE
HYALINE CASTS: 1 /LPF
KETONES URINE: NEGATIVE
LEUKOCYTE ESTERASE URINE: NEGATIVE
MICROSCOPIC-UA: NORMAL
NITRITE URINE: NEGATIVE
PH URINE: 6
PROT UR-MCNC: 139 MG/DL
PROTEIN URINE: ABNORMAL
RED BLOOD CELLS URINE: 0 /HPF
SPECIFIC GRAVITY URINE: 1.01
SQUAMOUS EPITHELIAL CELLS: 0 /HPF
UROBILINOGEN URINE: NORMAL
WHITE BLOOD CELLS URINE: 0 /HPF

## 2020-02-21 NOTE — REVIEW OF SYSTEMS
[Recent Weight Gain (___ Lbs)] : recent [unfilled] ~Ulb weight gain [Lower Ext Edema] : lower extremity edema [Nocturia] : nocturia [Negative] : Gastrointestinal [Fever] : no fever [Chills] : no chills [Feeling Poorly] : not feeling poorly [Recent Weight Loss (___ Lbs)] : no recent weight loss [Chest Pain] : no chest pain [Palpitations] : no palpitations [Shortness Of Breath] : no shortness of breath [Wheezing] : no wheezing [Cough] : no cough [SOB on Exertion] : no shortness of breath during exertion [Vomiting] : no vomiting [Abdominal Pain] : no abdominal pain [Constipation] : no constipation [Diarrhea] : no diarrhea [Dysuria] : no dysuria [Hesitancy] : no urinary hesitancy [Incontinence] : no incontinence

## 2020-02-21 NOTE — ASSESSMENT
[FreeTextEntry1] : Mr Garcia is a 68 y/o male with H/O HTN, anxiety, BPH, psoriasis and CKD\par \par CKD : mild stage 2/3 possibly long standing HTN/?underlying GN\par  His serological w/u in the past has been negative  \par Serum creatinine recently was close to 1.4 mg/dl \par Reiterated avoidance of NSAIDS/contrast.  \par Metabolic acidosis: Historically with Borderline low serum bicarbonate\par If Bicarb less than 20, will start sodium bicarbonate. Currently stable \par \par HTN/ Proteinuria: \par  Per him is BP is well controlled at home\par Repeat Pr/Cr ratio   \par Maintain on cardizem  240 +120  mg and losartan 100 ( maximal dose)\par Has edema: told him to decrease salt in his diet\par Also, loose weight\par If worsening proteinuria: suggested a kidney biopsy. However he would like to loose weight/dietary protein restriction  and repeat  \par He will call call me if any adverse effects\par  \par \par MBD: Ca/Phos is Ok with normal Vitamin D levels  \par \par F/U in 3  months\par \par Addendum:\par Proteinuria is up despite maximal ARB and cardizem dose\par He will loose weight/cut back on dietary protein intake and repeat\par If still with worsening, he will need a kidney biopsy\par   \par

## 2020-02-21 NOTE — PHYSICAL EXAM
[General Appearance - Alert] : alert [General Appearance - In No Acute Distress] : in no acute distress [Sclera] : the sclera and conjunctiva were normal [Examination Of The Oral Cavity] : the lips and gums were normal [PERRL With Normal Accommodation] : pupils were equal in size, round, and reactive to light [Oropharynx] : the oropharynx was normal [Neck Appearance] : the appearance of the neck was normal [] : the neck was supple [Exaggerated Use Of Accessory Muscles For Inspiration] : no accessory muscle use [Respiration, Rhythm And Depth] : normal respiratory rhythm and effort [Auscultation Breath Sounds / Voice Sounds] : lungs were clear to auscultation bilaterally [Apical Impulse] : the apical impulse was normal [Heart Sounds Gallop] : no gallops [Murmurs] : no murmurs [Heart Sounds] : normal S1 and S2 [FreeTextEntry1] : +psoriatic patches behind the ear

## 2020-02-21 NOTE — HISTORY OF PRESENT ILLNESS
[FreeTextEntry1] : Mr Sherman is a 70 y/o male with H/O anxiety, BPH and HTN ( for several years), mild CKD ( baseline creatinine: 1.2 mg/dl) with proteinuria  and lumbar stenosis  here or a follow up visit.  In  Nov of 2018,  he was  prescribed HCTZ 12.5 mg for edema ( GFR 60/Cr 1.2). Took for 1 month then  repeat blood work with increase in creatinine (GFR 46/Cr 1.53)stopped HCTZ. 10 days later got repeat blood work with improvement in renal function( GFR 58/Cr 1.2 mg/dl).  Overall his serum creatinine has been close to 1.2 mg/dl with sporadic readings of 1.4-1.5 mg/dl \par \par Comes for a follow up visit today. Had a couple of blood tests done earlier this year:\par  Creatinine has been ranging between 1.2-1.4 mg/dl . Bicarb 21-22.  His electrolytes were stable. He went for a vacation in Mid January where he ate a lot of protein and since then has noticed increased foaminess of his urine.   He is very concerned about it. Also, gained weight. BP and HR here are very high but he swears that the readings at home are well controlled

## 2022-02-16 ENCOUNTER — APPOINTMENT (OUTPATIENT)
Dept: NEPHROLOGY | Facility: CLINIC | Age: 72
End: 2022-02-16
Payer: MEDICARE

## 2022-02-16 VITALS
OXYGEN SATURATION: 97 % | HEART RATE: 120 BPM | DIASTOLIC BLOOD PRESSURE: 83 MMHG | TEMPERATURE: 98 F | SYSTOLIC BLOOD PRESSURE: 155 MMHG | WEIGHT: 241 LBS | BODY MASS INDEX: 33.74 KG/M2 | HEIGHT: 71 IN

## 2022-02-16 PROCEDURE — 99214 OFFICE O/P EST MOD 30 MIN: CPT | Mod: GC

## 2022-02-18 NOTE — PHYSICAL EXAM
[General Appearance - Alert] : alert [General Appearance - In No Acute Distress] : in no acute distress [Sclera] : the sclera and conjunctiva were normal [PERRL With Normal Accommodation] : pupils were equal in size, round, and reactive to light [Examination Of The Oral Cavity] : the lips and gums were normal [Oropharynx] : the oropharynx was normal [Neck Appearance] : the appearance of the neck was normal [] : no respiratory distress [Respiration, Rhythm And Depth] : normal respiratory rhythm and effort [Exaggerated Use Of Accessory Muscles For Inspiration] : no accessory muscle use [Auscultation Breath Sounds / Voice Sounds] : lungs were clear to auscultation bilaterally [Apical Impulse] : the apical impulse was normal [Heart Sounds] : normal S1 and S2 [Heart Sounds Gallop] : no gallops [Murmurs] : no murmurs [FreeTextEntry1] : +psoriatic patches behind the ear

## 2022-02-18 NOTE — ADDENDUM
[FreeTextEntry1] : Urine studies reviewed and discussed with the patient\par continues to have about 1.0g proteinuria despite being on ARB therapy\par will start Farxiga 5mg daily at this time\par explained to patient that if he is able to tolerate it for one week then to start taking 10mg daily \par he understood and agreed
[employer]

## 2022-02-18 NOTE — ASSESSMENT
[FreeTextEntry1] : Mr Garcia is a 68 y/o male with H/O HTN, anxiety, BPH, psoriasis and CKD\par \par CKD : mild stage 2/3 possibly long standing HTN\par His serological w/u in the past has been negative  \par Serum creatinine recently stable and was close to 1.4 mg/dl \par Most recent BMP 1/2022: Na 139; K 4.2; Cl 103; CO2 20; BUN 16\par Reiterated avoidance of NSAIDS/contrast.  \par he will need a repeat renal ultrasound next visit\par \par HTN/ Proteinuria: \par Per him is BP is well controlled at home; elevated in office likely because he is anxious\par Repeat Pr/Cr ratio today   \par Maintain on cardizem  240 +120  mg and losartan 100 (maximal dose)\par following repeat Pr/Cr ratio will consider starting patient on SGLT2i \par have discussed with patient about renal biopsy but he refuses at this time \par \par \par F/U in 3  months\par \par \par   \par

## 2022-02-18 NOTE — REVIEW OF SYSTEMS
[Recent Weight Gain (___ Lbs)] : recent [unfilled] ~Ulb weight gain [Lower Ext Edema] : lower extremity edema [Nocturia] : nocturia [Negative] : Gastrointestinal [Fever] : no fever [Chills] : no chills [Feeling Poorly] : not feeling poorly [Recent Weight Loss (___ Lbs)] : no recent weight loss [Chest Pain] : no chest pain [Palpitations] : no palpitations [Shortness Of Breath] : no shortness of breath [Wheezing] : no wheezing [Cough] : no cough [SOB on Exertion] : no shortness of breath during exertion [Abdominal Pain] : no abdominal pain [Vomiting] : no vomiting [Constipation] : no constipation [Diarrhea] : no diarrhea [Dysuria] : no dysuria [Incontinence] : no incontinence [Hesitancy] : no urinary hesitancy

## 2022-02-18 NOTE — HISTORY OF PRESENT ILLNESS
[FreeTextEntry1] : Mr Sherman is a 70 y/o male with H/O anxiety, BPH and HTN ( for several years), mild CKD ( baseline creatinine: 1.2 mg/dl) with proteinuria  and lumbar stenosis  here or a follow up visit.  In  Nov of 2018, he was prescribed HCTZ 12.5 mg for edema (GFR 60/Cr 1.2). Took for 1 month then  repeat blood work with increase in creatinine (GFR 46/Cr 1.53) stopped HCTZ.10 days later got repeat blood work with improvement in renal function( GFR 58/Cr 1.2 mg/dl).  Overall his serum creatinine has been close to 1.2 mg/dl with sporadic readings of 1.4-1.5 mg/dl \par \par Comes for a follow up visit today. He has had three blood tests since the last time he was seen in the office in February 2020 with the most recent one being in January 2022. He states that he tried to lose weight and last summer he was successful. He states when he lost weight he felt like the proteinuria improved as well and he saw less foam in his urine. However, after Omicron and the colder weather he gained all the weight back. He states he feels well but his blood pressure is elevated right now. He admits that he does feel nervous right now.

## 2022-04-21 ENCOUNTER — RX RENEWAL (OUTPATIENT)
Age: 72
End: 2022-04-21

## 2022-04-21 LAB
APPEARANCE: CLEAR
BACTERIA: NEGATIVE
BILIRUBIN URINE: NEGATIVE
BLOOD URINE: NEGATIVE
COLOR: NORMAL
CREAT SPEC-SCNC: 36 MG/DL
CREAT/PROT UR: 1 RATIO
GLUCOSE QUALITATIVE U: NEGATIVE
HYALINE CASTS: 0 /LPF
KETONES URINE: NEGATIVE
LEUKOCYTE ESTERASE URINE: NEGATIVE
MICROSCOPIC-UA: NORMAL
NITRITE URINE: NEGATIVE
PH URINE: 6.5
PROT UR-MCNC: 35 MG/DL
PROTEIN URINE: ABNORMAL
RED BLOOD CELLS URINE: 0 /HPF
SPECIFIC GRAVITY URINE: 1.01
SQUAMOUS EPITHELIAL CELLS: 0 /HPF
UROBILINOGEN URINE: NORMAL
WHITE BLOOD CELLS URINE: 1 /HPF

## 2023-01-13 ENCOUNTER — NON-APPOINTMENT (OUTPATIENT)
Age: 73
End: 2023-01-13

## 2023-07-05 RX ORDER — DAPAGLIFLOZIN 10 MG/1
10 TABLET, FILM COATED ORAL
Qty: 90 | Refills: 3 | Status: ACTIVE | COMMUNITY
Start: 2022-02-18 | End: 1900-01-01

## 2023-08-04 ENCOUNTER — NON-APPOINTMENT (OUTPATIENT)
Age: 73
End: 2023-08-04

## 2023-08-25 ENCOUNTER — APPOINTMENT (OUTPATIENT)
Dept: NEPHROLOGY | Facility: CLINIC | Age: 73
End: 2023-08-25
Payer: MEDICARE

## 2023-08-25 VITALS
OXYGEN SATURATION: 95 % | WEIGHT: 228 LBS | SYSTOLIC BLOOD PRESSURE: 159 MMHG | HEIGHT: 71 IN | DIASTOLIC BLOOD PRESSURE: 84 MMHG | BODY MASS INDEX: 31.92 KG/M2 | TEMPERATURE: 97.3 F | HEART RATE: 100 BPM

## 2023-08-25 VITALS — SYSTOLIC BLOOD PRESSURE: 140 MMHG | DIASTOLIC BLOOD PRESSURE: 60 MMHG

## 2023-08-25 DIAGNOSIS — N18.30 CHRONIC KIDNEY DISEASE, STAGE 3 UNSPECIFIED: ICD-10-CM

## 2023-08-25 DIAGNOSIS — I10 ESSENTIAL (PRIMARY) HYPERTENSION: ICD-10-CM

## 2023-08-25 DIAGNOSIS — F41.9 ANXIETY DISORDER, UNSPECIFIED: ICD-10-CM

## 2023-08-25 DIAGNOSIS — R80.9 PROTEINURIA, UNSPECIFIED: ICD-10-CM

## 2023-08-25 PROCEDURE — 99214 OFFICE O/P EST MOD 30 MIN: CPT

## 2023-08-27 LAB
25(OH)D3 SERPL-MCNC: 43 NG/ML
ALBUMIN SERPL ELPH-MCNC: 4.5 G/DL
ANION GAP SERPL CALC-SCNC: 15 MMOL/L
APPEARANCE: CLEAR
BACTERIA: NEGATIVE /HPF
BILIRUBIN URINE: NEGATIVE
BLOOD URINE: NEGATIVE
BUN SERPL-MCNC: 15 MG/DL
CALCIUM SERPL-MCNC: 9.5 MG/DL
CALCIUM SERPL-MCNC: 9.5 MG/DL
CAST: 0 /LPF
CHLORIDE SERPL-SCNC: 104 MMOL/L
CO2 SERPL-SCNC: 20 MMOL/L
COLOR: YELLOW
CREAT SERPL-MCNC: 1.82 MG/DL
CREAT SPEC-SCNC: 78 MG/DL
CREAT/PROT UR: 1.5 RATIO
EGFR: 38 ML/MIN/1.73M2
EPITHELIAL CELLS: 0 /HPF
FERRITIN SERPL-MCNC: 151 NG/ML
GLUCOSE QUALITATIVE U: >=1000 MG/DL
GLUCOSE SERPL-MCNC: 132 MG/DL
IRON SATN MFR SERPL: 29 %
IRON SERPL-MCNC: 105 UG/DL
KETONES URINE: NEGATIVE MG/DL
LEUKOCYTE ESTERASE URINE: NEGATIVE
MAGNESIUM SERPL-MCNC: 2.2 MG/DL
MICROSCOPIC-UA: NORMAL
NITRITE URINE: NEGATIVE
PARATHYROID HORMONE INTACT: 61 PG/ML
PH URINE: 6
PHOSPHATE SERPL-MCNC: 2.6 MG/DL
POTASSIUM SERPL-SCNC: 5 MMOL/L
PROT UR-MCNC: 117 MG/DL
PROTEIN URINE: 100 MG/DL
RED BLOOD CELLS URINE: 0 /HPF
REVIEW: NORMAL
SODIUM SERPL-SCNC: 139 MMOL/L
SPECIFIC GRAVITY URINE: 1.02
SPERM-LIKE CELLS: PRESENT
TIBC SERPL-MCNC: 358 UG/DL
UIBC SERPL-MCNC: 253 UG/DL
UROBILINOGEN URINE: 0.2 MG/DL
WHITE BLOOD CELLS URINE: 1 /HPF

## 2023-08-27 NOTE — ASSESSMENT
[FreeTextEntry1] : Mr Sherman is a 72 y/o male with H/O HTN, anxiety, BPH, psoriasis and CKD  CKD :  stage 2/3 possibly long standing HTN  vs underlying GN His serological w/u in the past has been negative   Serum creatinine earlier this year in January was at 1.7. He had covid prior to this.   Repeat labs today Reiterated avoidance of NSAIDS/contrast.   he will need a repeat renal ultrasound with PVR    HTN/ Proteinuria:  Per him is BP is well controlled at home; elevated in office likely because he is anxious Repeat Pr/Cr ratio today    Maintain on cardizem  240 +120  mg and losartan 100 (maximal dose) maintain on Farxiga We have discussed with patient about renal biopsy in the past but he has refused.    F/U in 3  months  ADDENDUM:  Kidney function is worse with serum creatinine up to 1.8 mgd/l Of note his creatinine in January of this year was also up at 1.7 Proteinuria persists but overall stable Marginally low bicarbonate noted. Monitor for now No e/o anemia or secondary hyperparathyroidism

## 2023-08-27 NOTE — HISTORY OF PRESENT ILLNESS
[FreeTextEntry1] : Mr Sherman is a 72 y/o male with H/O anxiety, BPH and HTN ( for several years), mild CKD ( baseline creatinine: 1.2 mg/dl) with proteinuria  and lumbar stenosis  here or a follow up visit.  In  Nov of 2018, he was prescribed HCTZ 12.5 mg for edema (GFR 60/Cr 1.2). Took for 1 month then  repeat blood work with increase in creatinine (GFR 46/Cr 1.53) stopped HCTZ.10 days later got repeat blood work with improvement in renal function( GFR 58/Cr 1.2 mg/dl).  Overall his serum creatinine has been close to 1.2 mg/dl with sporadic readings of 1.4-1.5 mg/dl   Comes for a follow up visit today.   A few weeks back he had called as he stated that the tip of his penis was warm and hurt when he  urinated. No erythema and discharge. He went to  and was told that he does not have a UTI. He stopped the farxiga with resolution of his symptoms. He is now back on it and states that he has some discomfort off and on but it's not too bad.  States his BP at home is well controlled. Still with bubbles in his urine, thinks it is related to protein in his diet. He does have leg edema ( left >Rt)

## 2023-08-27 NOTE — ADDENDUM
[FreeTextEntry1] : Urine studies reviewed and discussed with the patient\par  continues to have about 1.0g proteinuria despite being on ARB therapy\par  will start Farxiga 5mg daily at this time\par  explained to patient that if he is able to tolerate it for one week then to start taking 10mg daily \par  he understood and agreed

## 2023-08-27 NOTE — PHYSICAL EXAM
[General Appearance - Alert] : alert [General Appearance - In No Acute Distress] : in no acute distress [Sclera] : the sclera and conjunctiva were normal [PERRL With Normal Accommodation] : pupils were equal in size, round, and reactive to light [Neck Appearance] : the appearance of the neck was normal [] : no respiratory distress [Respiration, Rhythm And Depth] : normal respiratory rhythm and effort [Exaggerated Use Of Accessory Muscles For Inspiration] : no accessory muscle use [Auscultation Breath Sounds / Voice Sounds] : lungs were clear to auscultation bilaterally [Apical Impulse] : the apical impulse was normal [Heart Sounds] : normal S1 and S2 [Heart Sounds Gallop] : no gallops [Murmurs] : no murmurs [Abdomen Tenderness] : non-tender [Abdomen Mass (___ Cm)] : no abdominal mass palpated [Abdomen Hernia] : no hernia was discovered [No CVA Tenderness] : no ~M costovertebral angle tenderness [Oriented To Time, Place, And Person] : oriented to person, place, and time [Impaired Insight] : insight and judgment were intact [Affect] : the affect was normal [FreeTextEntry1] : ++  edema ( left >rT)

## 2023-08-27 NOTE — REVIEW OF SYSTEMS
[Recent Weight Gain (___ Lbs)] : recent [unfilled] ~Ulb weight gain [Lower Ext Edema] : lower extremity edema [Nocturia] : nocturia [Anxiety] : anxiety [Negative] : Endocrine [Fever] : no fever [Chills] : no chills [Feeling Poorly] : not feeling poorly [Feeling Tired] : not feeling tired [Recent Weight Loss (___ Lbs)] : no recent weight loss [Chest Pain] : no chest pain [Palpitations] : no palpitations [Shortness Of Breath] : no shortness of breath [Wheezing] : no wheezing [Cough] : no cough [SOB on Exertion] : no shortness of breath during exertion [Abdominal Pain] : no abdominal pain [Vomiting] : no vomiting [Constipation] : no constipation [Diarrhea] : no diarrhea [Dysuria] : no dysuria [Incontinence] : no incontinence [Hesitancy] : no urinary hesitancy [Depression] : no depression [Emotional Problems] : no emotional problems [Change In Personality] : no personality change